# Patient Record
Sex: MALE | Race: OTHER | HISPANIC OR LATINO | URBAN - METROPOLITAN AREA
[De-identification: names, ages, dates, MRNs, and addresses within clinical notes are randomized per-mention and may not be internally consistent; named-entity substitution may affect disease eponyms.]

---

## 2017-04-30 ENCOUNTER — EMERGENCY (EMERGENCY)
Facility: HOSPITAL | Age: 42
LOS: 1 days | Discharge: HOME | End: 2017-04-30
Attending: EMERGENCY MEDICINE | Admitting: EMERGENCY MEDICINE
Payer: COMMERCIAL

## 2017-04-30 VITALS
TEMPERATURE: 98 F | OXYGEN SATURATION: 98 % | SYSTOLIC BLOOD PRESSURE: 117 MMHG | HEART RATE: 81 BPM | DIASTOLIC BLOOD PRESSURE: 60 MMHG | RESPIRATION RATE: 16 BRPM

## 2017-04-30 VITALS
TEMPERATURE: 99 F | SYSTOLIC BLOOD PRESSURE: 130 MMHG | OXYGEN SATURATION: 97 % | DIASTOLIC BLOOD PRESSURE: 82 MMHG | RESPIRATION RATE: 20 BRPM | HEART RATE: 87 BPM

## 2017-04-30 DIAGNOSIS — R51 HEADACHE: ICD-10-CM

## 2017-04-30 LAB
ALBUMIN SERPL ELPH-MCNC: 4.8 G/DL — SIGNIFICANT CHANGE UP (ref 3.3–5)
ALP SERPL-CCNC: 60 U/L — SIGNIFICANT CHANGE UP (ref 40–120)
ALT FLD-CCNC: 46 U/L RC — HIGH (ref 10–45)
ANION GAP SERPL CALC-SCNC: 17 MMOL/L — SIGNIFICANT CHANGE UP (ref 5–17)
AST SERPL-CCNC: 23 U/L — SIGNIFICANT CHANGE UP (ref 10–40)
BILIRUB SERPL-MCNC: 0.2 MG/DL — SIGNIFICANT CHANGE UP (ref 0.2–1.2)
BUN SERPL-MCNC: 16 MG/DL — SIGNIFICANT CHANGE UP (ref 7–23)
CALCIUM SERPL-MCNC: 10.2 MG/DL — SIGNIFICANT CHANGE UP (ref 8.4–10.5)
CHLORIDE SERPL-SCNC: 100 MMOL/L — SIGNIFICANT CHANGE UP (ref 96–108)
CO2 SERPL-SCNC: 24 MMOL/L — SIGNIFICANT CHANGE UP (ref 22–31)
CREAT SERPL-MCNC: 0.92 MG/DL — SIGNIFICANT CHANGE UP (ref 0.5–1.3)
GLUCOSE SERPL-MCNC: 162 MG/DL — HIGH (ref 70–99)
HCT VFR BLD CALC: 44.8 % — SIGNIFICANT CHANGE UP (ref 39–50)
HGB BLD-MCNC: 15 G/DL — SIGNIFICANT CHANGE UP (ref 13–17)
MCHC RBC-ENTMCNC: 27.8 PG — SIGNIFICANT CHANGE UP (ref 27–34)
MCHC RBC-ENTMCNC: 33.5 GM/DL — SIGNIFICANT CHANGE UP (ref 32–36)
MCV RBC AUTO: 83 FL — SIGNIFICANT CHANGE UP (ref 80–100)
PLATELET # BLD AUTO: 247 K/UL — SIGNIFICANT CHANGE UP (ref 150–400)
POTASSIUM SERPL-MCNC: 3.9 MMOL/L — SIGNIFICANT CHANGE UP (ref 3.5–5.3)
POTASSIUM SERPL-SCNC: 3.9 MMOL/L — SIGNIFICANT CHANGE UP (ref 3.5–5.3)
PROT SERPL-MCNC: 8.1 G/DL — SIGNIFICANT CHANGE UP (ref 6–8.3)
RBC # BLD: 5.41 M/UL — SIGNIFICANT CHANGE UP (ref 4.2–5.8)
RBC # FLD: 11.9 % — SIGNIFICANT CHANGE UP (ref 10.3–14.5)
SODIUM SERPL-SCNC: 141 MMOL/L — SIGNIFICANT CHANGE UP (ref 135–145)
WBC # BLD: 11.7 K/UL — HIGH (ref 3.8–10.5)
WBC # FLD AUTO: 11.7 K/UL — HIGH (ref 3.8–10.5)

## 2017-04-30 PROCEDURE — 96374 THER/PROPH/DIAG INJ IV PUSH: CPT

## 2017-04-30 PROCEDURE — 70450 CT HEAD/BRAIN W/O DYE: CPT

## 2017-04-30 PROCEDURE — 96375 TX/PRO/DX INJ NEW DRUG ADDON: CPT

## 2017-04-30 PROCEDURE — 99285 EMERGENCY DEPT VISIT HI MDM: CPT

## 2017-04-30 PROCEDURE — 99284 EMERGENCY DEPT VISIT MOD MDM: CPT | Mod: 25

## 2017-04-30 PROCEDURE — 70450 CT HEAD/BRAIN W/O DYE: CPT | Mod: 26

## 2017-04-30 PROCEDURE — 80053 COMPREHEN METABOLIC PANEL: CPT

## 2017-04-30 PROCEDURE — 85027 COMPLETE CBC AUTOMATED: CPT

## 2017-04-30 RX ORDER — SODIUM CHLORIDE 9 MG/ML
1000 INJECTION INTRAMUSCULAR; INTRAVENOUS; SUBCUTANEOUS ONCE
Qty: 0 | Refills: 0 | Status: COMPLETED | OUTPATIENT
Start: 2017-04-30 | End: 2017-04-30

## 2017-04-30 RX ORDER — ACETAMINOPHEN 500 MG
1000 TABLET ORAL ONCE
Qty: 0 | Refills: 0 | Status: COMPLETED | OUTPATIENT
Start: 2017-04-30 | End: 2017-04-30

## 2017-04-30 RX ORDER — METOCLOPRAMIDE HCL 10 MG
10 TABLET ORAL ONCE
Qty: 0 | Refills: 0 | Status: COMPLETED | OUTPATIENT
Start: 2017-04-30 | End: 2017-04-30

## 2017-04-30 RX ADMIN — Medication 400 MILLIGRAM(S): at 18:24

## 2017-04-30 RX ADMIN — SODIUM CHLORIDE 2000 MILLILITER(S): 9 INJECTION INTRAMUSCULAR; INTRAVENOUS; SUBCUTANEOUS at 18:24

## 2017-04-30 RX ADMIN — Medication 1000 MILLIGRAM(S): at 18:36

## 2017-04-30 RX ADMIN — Medication 300 MILLIGRAM(S): at 18:36

## 2017-04-30 RX ADMIN — Medication 10 MILLIGRAM(S): at 18:35

## 2017-04-30 NOTE — ED PROVIDER NOTE - CARE PLAN
Principal Discharge DX:	Nonintractable migraine, unspecified migraine type  Secondary Diagnosis:	Folliculitis

## 2017-04-30 NOTE — ED ADULT NURSE NOTE - CHIEF COMPLAINT QUOTE
pt c/o congestion very thirsty and blurry vision with a headache today pt c/o congestion very thirsty and blurry vision with a headache today also itchy bumps between legs groin area

## 2017-04-30 NOTE — ED ADULT NURSE NOTE - ED STAT RN HANDOFF DETAILS
Report given to change of shift RN. Wife is at the bedside with patient. Easy work of breathing. Safety maintained. Call bell at reach.

## 2017-04-30 NOTE — ED ADULT NURSE NOTE - OBJECTIVE STATEMENT
40 y/o male pt. presents to the ED ambulatory with wife at the bedside. A&Ox4. C/O Right sided headache and left eye blurry vision that started this morning. Pt. reports SOB and "my throat is very dry" Pt. reports rash of bilateral inner thighs x2 weeks after having intercourse with wife. Pt. reports pain with urination that started yesterday. Pt. denies increase in frequency and change in color and odor. Pt. denies dizziness, cough, chest pain, N/V/D, numbness and tingling. PERRL +peripheral pulse and cap refill <2 seconds. . Easy work of breathing. Full range of motion of upper and lower extremities. No arm drift and leg drift. No neck or back pain. Safety maintained. 40 y/o male pt. presents to the ED ambulatory with wife at the bedside. A&Ox4. C/O Right sided headache and left eye blurry vision that started this morning. Pt. reports SOB and "my throat is very dry" Pt. reports rash of bilateral inner thighs x2 weeks after having intercourse with wife. Pt. reports pain with urination that started yesterday. Pt. denies increase in frequency and change in color and odor. Pt. denies dizziness, cough, chest pain, N/V/D, numbness and tingling. PERRL +peripheral pulse and cap refill <2 seconds. . Easy work of breathing. Full range of motion of upper and lower extremities. No arm drift and leg drift. No facial droop or slurred speech. No neck or back pain. Safety maintained.

## 2017-04-30 NOTE — ED PROVIDER NOTE - NEUROLOGICAL, MLM
Alert and oriented, no focal deficits, no motor or sensory deficits. CN II-XII intact, strength equal in all 4 extremities

## 2017-04-30 NOTE — ED PROVIDER NOTE - PROGRESS NOTE DETAILS
Pt feels much better, back to normal. Will dc home with dx of migraine. Tx folliculitis with clinda and fu PMD.

## 2017-04-30 NOTE — ED PROVIDER NOTE - MEDICAL DECISION MAKING DETAILS
1. Folliculitis, will treat with Clindamycin.  2. HA with blurred vision. CT brain, labs, treat symptomatically and re-evaluate.

## 2017-04-30 NOTE — ED PROVIDER NOTE - NS ED MD SCRIBE ATTENDING SCRIBE SECTIONS
HISTORY OF PRESENT ILLNESS/HIV/PHYSICAL EXAM/REVIEW OF SYSTEMS/PAST MEDICAL/SURGICAL/SOCIAL HISTORY/RESULTS/INTAKE ASSESSMENT/SCREENINGS/VITAL SIGNS( Pullset)

## 2017-04-30 NOTE — ED PROVIDER NOTE - OBJECTIVE STATEMENT
41 year old male with no significant PMHx, presents with complaint of nonexertional HA associated with blurry vision in left eye. States HA started first while sitting down and radiated toward his eye with onset of blurry vision. Pt complaining of itchy, sensitive rash on inner thighs and nose.   No recent fevers, chills, N/V, abdominal pain, slurred speech, neck pain, weakness/tingling/numbness to extremities. Denies polyuria but endorses polydipsia and dry mouth.

## 2017-05-05 ENCOUNTER — EMERGENCY (EMERGENCY)
Facility: HOSPITAL | Age: 42
LOS: 1 days | Discharge: HOME | End: 2017-05-05
Attending: EMERGENCY MEDICINE
Payer: COMMERCIAL

## 2017-05-05 VITALS
SYSTOLIC BLOOD PRESSURE: 138 MMHG | HEIGHT: 72 IN | TEMPERATURE: 98 F | WEIGHT: 257.94 LBS | HEART RATE: 89 BPM | OXYGEN SATURATION: 99 % | RESPIRATION RATE: 20 BRPM | DIASTOLIC BLOOD PRESSURE: 84 MMHG

## 2017-05-05 DIAGNOSIS — Z79.52 LONG TERM (CURRENT) USE OF SYSTEMIC STEROIDS: ICD-10-CM

## 2017-05-05 DIAGNOSIS — R21 RASH AND OTHER NONSPECIFIC SKIN ERUPTION: ICD-10-CM

## 2017-05-05 DIAGNOSIS — Z79.2 LONG TERM (CURRENT) USE OF ANTIBIOTICS: ICD-10-CM

## 2017-05-05 DIAGNOSIS — N50.89 OTHER SPECIFIED DISORDERS OF THE MALE GENITAL ORGANS: ICD-10-CM

## 2017-05-05 PROCEDURE — 99284 EMERGENCY DEPT VISIT MOD MDM: CPT | Mod: 25

## 2017-05-05 NOTE — ED ADULT NURSE NOTE - OBJECTIVE STATEMENT
PT P/W PAIN, ITCHING, AND BUMPS ON GENITALS ON INNER LEGS X 1 WEEK . PT P/W PAIN, ITCHING, AND BUMPS ON GENITALS AND INNER LEGS X 1 WEEK .

## 2017-05-06 LAB
APPEARANCE UR: CLEAR — SIGNIFICANT CHANGE UP
BACTERIA # UR AUTO: ABNORMAL /HPF
BILIRUB UR-MCNC: NEGATIVE — SIGNIFICANT CHANGE UP
COLOR SPEC: YELLOW — SIGNIFICANT CHANGE UP
DIFF PNL FLD: ABNORMAL
EPI CELLS # UR: ABNORMAL (ref 0–10)
GLUCOSE UR QL: NEGATIVE — SIGNIFICANT CHANGE UP
KETONES UR-MCNC: NEGATIVE — SIGNIFICANT CHANGE UP
LEUKOCYTE ESTERASE UR-ACNC: ABNORMAL
NITRITE UR-MCNC: NEGATIVE — SIGNIFICANT CHANGE UP
PH UR: 5 — SIGNIFICANT CHANGE UP (ref 5–8)
PROT UR-MCNC: NEGATIVE — SIGNIFICANT CHANGE UP
RBC CASTS # UR COMP ASSIST: ABNORMAL /HPF (ref 0–2)
SP GR SPEC: 1.02 — SIGNIFICANT CHANGE UP (ref 1.01–1.02)
UROBILINOGEN FLD QL: NEGATIVE — SIGNIFICANT CHANGE UP
WBC UR QL: ABNORMAL /HPF (ref 0–5)

## 2017-05-06 PROCEDURE — 81001 URINALYSIS AUTO W/SCOPE: CPT

## 2017-05-06 PROCEDURE — 99283 EMERGENCY DEPT VISIT LOW MDM: CPT

## 2017-05-06 RX ORDER — FLUCONAZOLE 150 MG/1
150 TABLET ORAL ONCE
Qty: 0 | Refills: 0 | Status: COMPLETED | OUTPATIENT
Start: 2017-05-06 | End: 2017-05-06

## 2017-05-06 RX ORDER — MICONAZOLE NITRATE 2 %
1 CREAM (GRAM) TOPICAL
Qty: 1 | Refills: 0 | OUTPATIENT
Start: 2017-05-06 | End: 2017-05-20

## 2017-05-06 RX ADMIN — FLUCONAZOLE 150 MILLIGRAM(S): 150 TABLET ORAL at 00:51

## 2017-05-06 NOTE — ED PROVIDER NOTE - MEDICAL DECISION MAKING DETAILS
Pt with suspected candidal infection. Will give PO antifungal as well as topical cream. Pt instructed to discontinue current medications. Pt with suspected fungal infection. Will give PO antifungal as well as topical cream. Pt instructed to discontinue current medications.

## 2017-05-06 NOTE — ED PROVIDER NOTE - PHYSICAL EXAMINATION
: erythema to b/l inguinal area and to head of penis with papules and excoriations : erythema to b/l inguinal area and to head of penis with papules and excoriations  no vesicles or d/c, no ttp, no induration

## 2017-05-06 NOTE — ED PROVIDER NOTE - NS ED MD SCRIBE ATTENDING SCRIBE SECTIONS
HIV/PAST MEDICAL/SURGICAL/SOCIAL HISTORY/VITAL SIGNS( Pullset)/PHYSICAL EXAM/HISTORY OF PRESENT ILLNESS/DISPOSITION/REVIEW OF SYSTEMS

## 2017-05-06 NOTE — ED PROVIDER NOTE - OBJECTIVE STATEMENT
40 y/o M pt with no PMHx presents to the ED c/o genital rash x 1 week. Pt reports rash was itchy at first to inguinal area which has since spread to penis and is now painful. Pt was prescribe PO ABx and steroid 5 days ago with mild relief. Pt is sexually active with wife who notes no Sx. Pt denies fever, DM or any other complaints at this time. NKDA. 42 y/o M pt with no PMHx presents to the ED c/o genital rash x 1 week. Pt reports rash was itchy at first to inguinal area which has since spread to penis and is now painful. Pt was prescribe PO ABx and steroid 5 days ago with no relief. Pt is sexually active with wife who notes no Sx. Pt denies fever, DM or any other complaints at this time. NKDA.

## 2022-01-03 NOTE — ED ADULT NURSE REASSESSMENT NOTE - NS ED NURSE REASSESS COMMENT FT1
Pt. passed dysphagia screening. No cough, wet voice, or difficulty swallowing. Will continue to monitor.
Received report from ZEUS dooley. Safety checked. No c/o pain or discomfort at this time. Comfort care provided. Pt encouraged to call for assist when needed. pending dc
DISPLAY PLAN FREE TEXT

## 2023-03-27 NOTE — ED ADULT TRIAGE NOTE - DIRECT TO ROOM CARE INITIATED:
05-May-2017 23:38 Billing Type: Third-Party Bill Performing Laboratory: 0 Expected Date Of Service: 03/27/2023 Bill For Surgical Tray: no
